# Patient Record
Sex: FEMALE | Race: WHITE | URBAN - METROPOLITAN AREA
[De-identification: names, ages, dates, MRNs, and addresses within clinical notes are randomized per-mention and may not be internally consistent; named-entity substitution may affect disease eponyms.]

---

## 2021-12-09 ENCOUNTER — VIRTUAL VISIT (OUTPATIENT)
Dept: PULMONOLOGY | Age: 81
End: 2021-12-09

## 2021-12-09 DIAGNOSIS — F41.1 GAD (GENERALIZED ANXIETY DISORDER): ICD-10-CM

## 2021-12-09 DIAGNOSIS — G47.00 INSOMNIA, UNSPECIFIED TYPE: Primary | ICD-10-CM

## 2021-12-09 PROCEDURE — 99203 OFFICE O/P NEW LOW 30 MIN: CPT | Performed by: INTERNAL MEDICINE

## 2021-12-09 RX ORDER — EZETIMIBE 10 MG/1
TABLET ORAL
COMMUNITY

## 2021-12-09 RX ORDER — CRANBERRY FRUIT EXTRACT 200 MG
CAPSULE ORAL
COMMUNITY

## 2021-12-09 RX ORDER — ESZOPICLONE 3 MG/1
2 TABLET, FILM COATED ORAL NIGHTLY
COMMUNITY

## 2021-12-09 RX ORDER — BETAMETHASONE DIPROPIONATE 0.05 %
OINTMENT (GRAM) TOPICAL
COMMUNITY

## 2021-12-09 RX ORDER — GABAPENTIN 100 MG/1
CAPSULE ORAL
COMMUNITY

## 2021-12-09 RX ORDER — CLOBETASOL PROPIONATE 0.5 MG/G
CREAM TOPICAL
COMMUNITY

## 2021-12-09 RX ORDER — LORAZEPAM 0.5 MG/1
TABLET ORAL
COMMUNITY
Start: 2021-12-02

## 2021-12-09 ASSESSMENT — SLEEP AND FATIGUE QUESTIONNAIRES
HOW LIKELY ARE YOU TO NOD OFF OR FALL ASLEEP WHILE LYING DOWN TO REST IN THE AFTERNOON WHEN CIRCUMSTANCES PERMIT: 1
HOW LIKELY ARE YOU TO NOD OFF OR FALL ASLEEP WHILE SITTING INACTIVE IN A PUBLIC PLACE: 0
HOW LIKELY ARE YOU TO NOD OFF OR FALL ASLEEP WHILE WATCHING TV: 1
HOW LIKELY ARE YOU TO NOD OFF OR FALL ASLEEP WHILE SITTING AND READING: 1
HOW LIKELY ARE YOU TO NOD OFF OR FALL ASLEEP WHILE SITTING AND TALKING TO SOMEONE: 0
ESS TOTAL SCORE: 3
HOW LIKELY ARE YOU TO NOD OFF OR FALL ASLEEP WHILE SITTING QUIETLY AFTER LUNCH WITHOUT ALCOHOL: 0
HOW LIKELY ARE YOU TO NOD OFF OR FALL ASLEEP IN A CAR, WHILE STOPPED FOR A FEW MINUTES IN TRAFFIC: 0
HOW LIKELY ARE YOU TO NOD OFF OR FALL ASLEEP WHEN YOU ARE A PASSENGER IN A CAR FOR AN HOUR WITHOUT A BREAK: 0

## 2021-12-09 NOTE — PROGRESS NOTES
Amira Venegas MD, Isela Herrera, [facility]  Tiffanie Kehrt CNP  Catherinesidney Gael JAMISON [address]  [address] [facility], [address] [facility] [phone]   [facility] [address]. [address] [facility]. Jair [address] [phone]     Video Visit- Consult    Pursuant to the emergency declaration under the [address] [place] [address] [facility] authority and the [employer] Act, this Virtual  Visit was conducted, with patient's consent, to reduce the patient's risk of exposure to COVID-19. Services were provided through a video synchronous discussion virtually to substitute for in-person clinic visit. Patient was located in their home. Assessment:      Visit Diagnoses and Associated Orders     Insomnia, unspecified type   (New Problem)  -  Primary    needs treatment         ALYSSA (generalized anxiety disorder)   (New Problem)      Needs treatment         ORDERS WITHOUT AN ASSOCIATED DIAGNOSIS    betamethasone dipropionate (DIPROLENE) 0.05 % ointment [1029]      vitamin D (CHOLECALCIFEROL) 25 MCG (1000 UT) TABS tablet [71734]      clobetasol (TEMOVATE) 0.05 % cream [6130]      cyanocobalamin 1000 MCG tablet [2009]      ezetimibe (ZETIA) 10 MG tablet [46095]      gabapentin (NEURONTIN) 100 MG capsule [08701]      hydrocortisone (WESTCORT) 0.2 % cream [37002]      LORazepam (ATIVAN) 0.5 MG tablet [8612]      Red Yeast Rice Extract 600 MG CAPS [78026]      eszopiclone (ESZOPICLONE) 3 MG TABS [63801]             Plan:      Had extended discussion with the patient about my concerns of needing a benzodiazepine or Lunesta to still help her maintain sleep. Somewhat unusual that there was a sudden onset of insomnia without any other underlying issues. During our visit the patient spoke several times about being anxious and constantly worried about things.   We had extended discussion about underlying anxiety being a possible cause of her insomnia and that needing treatment on a regular basis versus symptomatic treatment for her sleep issues. Discussed with the patient would probably benefit from some daily treatment for generalized anxiety combined with CBTI. This information was analyzed to assess complexity and medical decision making in regards to further testing and management. PDMP report reviewed today. Reviewed external notes from neurology dated 2/17/2020 patient history of severe polyneuropathy. Subjective:     Patient ID: Jada Elias is a 80 y.o. female. Chief Complaint   Patient presents with    Insomnia     new patient, reports trouble falling asleep at night, no previous sleep study       HPI:      Jada Elias is a 80 y.o. female self-referred for a sleep evaluation. Patient with a recent history of inability to initiate sleep. She states months ago she was at her son's house and also was not able to sleep. She was awake for hours for 2 nights in a row and then when she came back home still had issues falling asleep. She does not have complaints of pain that keeps her awake but feels her mind races throughout the nighttime. She often does worry about things and endorses some symptoms are consistent with anxiety. She was seen by another sleep physician and put on 9 mg of melatonin and 20 mg of gabapentin which helped initially. She had tried trazodone which made her feel groggy during the daytime. There is still 1-2 nights every week that she does not fall asleep so she will use either Lunesta 3 mg or lorazepam 0.5 mg. The patient often really worries about not being able to sleep. When she takes lorazepam she sleeps quite well. She has had a history of polyneuropathy which does bother her and she worries about her neuropathy but does not feel that pain keeps her awake at nighttime. Previous Report(s) Reviewed: historical medical records, office notes, and referral letter(s).  Pertinent data has been documented. [place] - Total score: 3    Caffeine Intake - None. Social History     Socioeconomic History    Marital status:      Spouse name: Not on file    Number of children: Not on file    Years of education: Not on file    Highest education level: Not on file   Occupational History    Not on file   Tobacco Use    Smoking status: Never Smoker    Smokeless tobacco: Never Used   Vaping Use    Vaping Use: Never used   Substance and Sexual Activity    Alcohol use: Yes     Alcohol/week: 0.0 standard drinks     Comment: rare    Drug use: Never    Sexual activity: Not on file   Other Topics Concern    Not on file   Social History Narrative    Not on file     Social Determinants of Health     Financial Resource Strain:     Difficulty of Paying Living Expenses: Not on file   Food Insecurity:     Worried About Running Out of Food in the Last Year: Not on file    Tucker of Food in the Last Year: Not on file   Transportation Needs:     Lack of Transportation (Medical): Not on file    Lack of Transportation (Non-Medical):  Not on file   Physical Activity:     Days of Exercise per Week: Not on file    Minutes of Exercise per Session: Not on file   Stress:     Feeling of Stress : Not on file   Social Connections:     Frequency of Communication with Friends and Family: Not on file    Frequency of Social Gatherings with Friends and Family: Not on file    Attends Evangelical Services: Not on file    Active Member of [address] [place] [address] or Organizations: Not on file    Attends Club or Organization Meetings: Not on file    Marital Status: Not on file   Intimate Partner Violence:     Fear of Current or Ex-Partner: Not on file    Emotionally Abused: Not on file    Physically Abused: Not on file    Sexually Abused: Not on file   Housing Stability:     Unable to Pay for Housing in the Last Year: Not on file    Number of [place] in the Last Year: Not on file    Unstable Housing in the Last Year: Not on

## 2021-12-09 NOTE — LETTER
[facility]  [place] [address] [place] [address]  Phone: [phone]  Fax: [phone]           Aneesh Vega MD      December 14, 2021     Patient: Lavon Murillo   MR Number: <[id number]>   YOB: 1940   Date of Visit: 12/9/2021       Dear Dr. Ceja Situ ref. provider found: Thank you for referring Lavon Murillo to me for evaluation/treatment. Below are the relevant portions of my assessment and plan of care. Visit Diagnoses and Associated Orders     Insomnia, unspecified type   (New Problem)  -  Primary    needs treatment         ALYSSA (generalized anxiety disorder)   (New Problem)      Needs treatment         ORDERS WITHOUT AN ASSOCIATED DIAGNOSIS    betamethasone dipropionate (DIPROLENE) 0.05 % ointment [1029]      vitamin D (CHOLECALCIFEROL) 25 MCG (1000 UT) TABS tablet [42745]      clobetasol (TEMOVATE) 0.05 % cream [6030]      cyanocobalamin 1000 MCG tablet [2009]      ezetimibe (ZETIA) 10 MG tablet [06132]      gabapentin (NEURONTIN) 100 MG capsule [61789]      hydrocortisone (WESTCORT) 0.2 % cream [62819]      LORazepam (ATIVAN) 0.5 MG tablet [6412]      Red Yeast Rice Extract 600 MG CAPS [16060]      eszopiclone (ESZOPICLONE) 3 MG TABS [48724]            Had extended discussion with the patient about my concerns of needing a benzodiazepine or Lunesta to still help her maintain sleep. Somewhat unusual that there was a sudden onset of insomnia without any other underlying issues. During our visit the patient spoke several times about being anxious and constantly worried about things. We had extended discussion about underlying anxiety being a possible cause of her insomnia and that needing treatment on a regular basis versus symptomatic treatment for her sleep issues. Discussed with the patient would probably benefit from some daily treatment for generalized anxiety combined with CBTI.     This information was analyzed to assess complexity and medical decision making in regards to further testing and management. PDMP report reviewed today. Reviewed external notes from neurology dated 2/17/2020 patient history of severe polyneuropathy. If you have questions, please do not hesitate to call me. I look forward to following Abner Contreras along with you.     Sincerely,        Mónica Luz MD    CC providers:  [id number]  [place] [address]  Via Fax: [phone]

## 2022-03-16 ENCOUNTER — TELEMEDICINE (OUTPATIENT)
Dept: PULMONOLOGY | Age: 82
End: 2022-03-16
Payer: MEDICARE

## 2022-03-16 DIAGNOSIS — G47.00 INSOMNIA, UNSPECIFIED TYPE: Primary | ICD-10-CM

## 2022-03-16 PROBLEM — F41.1 GAD (GENERALIZED ANXIETY DISORDER): Status: RESOLVED | Noted: 2022-03-16 | Resolved: 2022-03-16

## 2022-03-16 PROBLEM — F41.1 GAD (GENERALIZED ANXIETY DISORDER): Status: ACTIVE | Noted: 2022-03-16

## 2022-03-16 PROCEDURE — 99213 OFFICE O/P EST LOW 20 MIN: CPT | Performed by: INTERNAL MEDICINE

## 2022-03-16 PROCEDURE — 4040F PNEUMOC VAC/ADMIN/RCVD: CPT | Performed by: INTERNAL MEDICINE

## 2022-03-16 PROCEDURE — 1090F PRES/ABSN URINE INCON ASSESS: CPT | Performed by: INTERNAL MEDICINE

## 2022-03-16 PROCEDURE — 1123F ACP DISCUSS/DSCN MKR DOCD: CPT | Performed by: INTERNAL MEDICINE

## 2022-03-16 PROCEDURE — G8400 PT W/DXA NO RESULTS DOC: HCPCS | Performed by: INTERNAL MEDICINE

## 2022-03-16 PROCEDURE — G8427 DOCREV CUR MEDS BY ELIG CLIN: HCPCS | Performed by: INTERNAL MEDICINE

## 2022-03-16 ASSESSMENT — SLEEP AND FATIGUE QUESTIONNAIRES
ESS TOTAL SCORE: 1
HOW LIKELY ARE YOU TO NOD OFF OR FALL ASLEEP WHILE SITTING AND READING: 0
HOW LIKELY ARE YOU TO NOD OFF OR FALL ASLEEP WHILE WATCHING TV: 0
HOW LIKELY ARE YOU TO NOD OFF OR FALL ASLEEP WHILE SITTING AND TALKING TO SOMEONE: 0
HOW LIKELY ARE YOU TO NOD OFF OR FALL ASLEEP WHILE SITTING INACTIVE IN A PUBLIC PLACE: 0
HOW LIKELY ARE YOU TO NOD OFF OR FALL ASLEEP WHILE LYING DOWN TO REST IN THE AFTERNOON WHEN CIRCUMSTANCES PERMIT: 1
HOW LIKELY ARE YOU TO NOD OFF OR FALL ASLEEP WHEN YOU ARE A PASSENGER IN A CAR FOR AN HOUR WITHOUT A BREAK: 0
HOW LIKELY ARE YOU TO NOD OFF OR FALL ASLEEP WHILE SITTING QUIETLY AFTER LUNCH WITHOUT ALCOHOL: 0
HOW LIKELY ARE YOU TO NOD OFF OR FALL ASLEEP IN A CAR, WHILE STOPPED FOR A FEW MINUTES IN TRAFFIC: 0

## 2022-03-16 NOTE — ASSESSMENT & PLAN NOTE
Chronic-stable: Patient feels her anxiety is really not an issue at this time. She is sleeping well with gabapentin 3 mg and melatonin 5 mg which is being managed by her primary care doctor. At this point we will have the patient continue this regimen and reiterated to her that she is better off on this regimen versus a daily benzodiazepine or Lunesta. If her insomnia worsens we can see the patient back to discuss further. I have encouraged the patient to continue to have open discussions with the primary care doctor about any potential for anxiety that may need daily medication to treat versus benzos on a as needed basis if her insomnia worsens. At this time we will follow the patient up on a as needed basis.

## 2022-03-16 NOTE — LETTER
[facility]  [address] [address] [place] [address]  Phone: [phone]  Fax: [phone]    Brandi Crump MD    March 16, 2022     [id number] [facility] [address]    Patient: Alma Hinds   MR Number: [id number]   YOB: 1940   Date of Visit: 3/16/2022       Dear Ciara Rodriguez: Thank you for referring Alma Hinds to me for evaluation/treatment. Below are the relevant portions of my assessment and plan of care. 1. Insomnia, unspecified type  Assessment & Plan:   Chronic-stable: Patient feels her anxiety is really not an issue at this time. She is sleeping well with gabapentin 3 mg and melatonin 5 mg which is being managed by her primary care doctor. At this point we will have the patient continue this regimen and reiterated to her that she is better off on this regimen versus a daily benzodiazepine or Lunesta. If her insomnia worsens we can see the patient back to discuss further. I have encouraged the patient to continue to have open discussions with the primary care doctor about any potential for anxiety that may need daily medication to treat versus benzos on a as needed basis if her insomnia worsens. At this time we will follow the patient up on a as needed basis. PDMP report was reviewed. This information was analyzed to assess complexity and medical decision making in regards to further testing and management. If you have questions, please do not hesitate to call me. I look forward to following Olivia Subramanian along with you.     Sincerely,      Brandi Crump MD

## 2022-03-16 NOTE — PROGRESS NOTES
Stephanie Izaguirre MD, [facility], [facility]  Janet [address] SHAHEEN [address]  [address] [facility], [address] [phone]   [facility] [address]. [address] [place] [address]. [address] [phone]     Video Visit- Follow up    Pat Farmer, was evaluated through a synchronous (real-time) audio-video  encounter. The patient (or guardian if applicable) is aware that this is a billable  service, which includes applicable co-pays. This Virtual Visit was conducted with  patient's (and/or legal guardian's) consent. The visit was conducted pursuant to  the emergency declaration under the [address] [place] [address], [address] [place] [address] authority and the [employer] and  [employer] Act. Patient identification was verified,  and a caregiver was present when appropriate. The patient was located in a  state where the provider was licensed to provide care. Assessment/Plan:      1. Insomnia, unspecified type  Assessment & Plan:   Chronic-stable: Patient feels her anxiety is really not an issue at this time. She is sleeping well with gabapentin 3 mg and melatonin 5 mg which is being managed by her primary care doctor. At this point we will have the patient continue this regimen and reiterated to her that she is better off on this regimen versus a daily benzodiazepine or Lunesta. If her insomnia worsens we can see the patient back to discuss further. I have encouraged the patient to continue to have open discussions with the primary care doctor about any potential for anxiety that may need daily medication to treat versus benzos on a as needed basis if her insomnia worsens. At this time we will follow the patient up on a as needed basis. PDMP report was reviewed. This information was analyzed to assess complexity and medical decision making in regards to further testing and management. Subjective:     Patient ID: Pat Farmer is a 80 y.o. female. Chief Complaint   Patient presents with    Insomnia     Subjective   HPI:    Really did not feel anxiety was playing an issue for her. She has been placed on gabapentin for neuropathy now taking 300 mg along with melatonin 5 mg and feels she is sleeping very well with this regimen. She is not had to use any benzodiazepines or Lunesta for sleep. [place] - Total score: 1    Current Outpatient Medications   Medication Instructions    betamethasone dipropionate (DIPROLENE) 0.05 % ointment betamethasone dipropionate 0.05 % topical ointment   APPLY A THIN LAYER TO THE AFFECTED AREA(S) BY TOPICAL ROUTE ONCE DAILY AS DIRECTED    clobetasol (TEMOVATE) 0.05 % cream clobetasol 0.05 % topical cream    cyanocobalamin 1,000 mcg, Oral, DAILY    eszopiclone (ESZOPICLONE) 2 mg, Oral, NIGHTLY    ezetimibe (ZETIA) 10 MG tablet ezetimibe 10 mg tablet    gabapentin (NEURONTIN) 100 MG capsule gabapentin 100 mg capsule    hydrocortisone (WESTCORT) 0.2 % cream hydrocortisone valerate 0.2 % topical cream    LORazepam (ATIVAN) 0.5 MG tablet No dose, route, or frequency recorded.     Red Yeast Rice Extract 600 MG CAPS Oral    vitamin D (CHOLECALCIFEROL) 1,000 Units, Oral, DAILY        Electronically signed by Zarina Ingram MD on 3/16/2022 at 12:14 PM

## 2025-04-10 ENCOUNTER — PROCEDURE VISIT (OUTPATIENT)
Dept: SURGERY | Age: 85
End: 2025-04-10
Payer: MEDICARE

## 2025-04-10 VITALS — HEART RATE: 88 BPM | DIASTOLIC BLOOD PRESSURE: 63 MMHG | SYSTOLIC BLOOD PRESSURE: 107 MMHG

## 2025-04-10 DIAGNOSIS — C44.319 BASAL CELL CARCINOMA (BCC) OF RIGHT CHEEK: Primary | ICD-10-CM

## 2025-04-10 PROCEDURE — 12052 INTMD RPR FACE/MM 2.6-5.0 CM: CPT | Performed by: DERMATOLOGY

## 2025-04-10 PROCEDURE — 17311 MOHS 1 STAGE H/N/HF/G: CPT | Performed by: DERMATOLOGY

## 2025-04-10 RX ORDER — ESCITALOPRAM OXALATE 5 MG/1
5 TABLET ORAL DAILY
COMMUNITY

## 2025-04-10 NOTE — PATIENT INSTRUCTIONS
massage the area with a moisturizer, petroleum jelly (Vaseline) or Aquaphor. This helps to soften the scar tissue. You can begin this 1 month after the day of your surgery.  -A Silicone scar gel product may also be beneficial in regards to scar appearance. This can be used but is not usually necessary.  -The color of the scar will even out over time, but may remain pink for several months.     Call us at [phone] right away if you have any of the following symptoms:  -Bleeding that you cannot stop (see highlighted area above)   -Pain that lasts longer than 48 hours  -Your wound becomes more painful, red or hot to touch  -Bruising and swelling that does not begin to improve within the 48 hours or gets worse suddenly.

## 2025-04-10 NOTE — PROGRESS NOTES
MOHS PROCEDURE NOTE    PHYSICIAN:  Estefany Mendez MD, Who operated in two distinct and integrated capacities as the surgeon removing the tissue and as the pathologist examining the tissue.    ASSISTANT: Jalen Morales RN     REFERRING PROVIDER:  Deniz Felipe MD    PREOPERATIVE DIAGNOSIS:  Nodular Basal Cell Carcinoma, Ulcerated      SPECIFIC MOHS INDICATIONS:  location and need for tissue conservation    AUC SCORIN/9    POSTOPERATIVE DIAGNOSIS: SAME    LOCATION: Right Cheek    OPERATIVE PROCEDURE:  MOHS MICROGRAPHIC SURGERY    RECONSTRUCTION OF DEFECT: Intermediate layered closure    PREOPERATIVE SIZE: 8 x 6 MM    DEFECT SIZE: 12 x 12 MM    LENGTH OF REPAIRED WOUND/SIZE OF FLAP/SIZE OF GRAFT:  38 MM    ANESTHESIA:  7 mL 1% lidocaine with epinephrine 1:100,000 buffered.     EBL:  MINIMAL    DURATION OF PROCEDURE:  45 MIN    POSTOPERATIVE OBSERVATION: 30 MIN    SPECIMENS:  SEE MOHS MAP    COMPLICATIONS:  NONE    DESCRIPTION OF PROCEDURE:  The patient was given a mirror, as appropriate, and the biopsy site was identified, marked with a surgical marking pen, and verified by the patient.   Options for treatment were discussed and the patient was informed that Mohs surgery was the selected treatment based on its lower recurrence rate, given the features listed above, as compared to other treatment modalities such as excision, radiation, or curettage, and agreed with this treatment plan.  Risks and benefits including bruising, swelling, bleeding, infection, nerve injury, recurrence, and scarring were discussed with the patient prior to the procedure and a written consent detailing these and other risks was reviewed with the patient and signed.    There was a time out for person and procedure verification.  The surgical site was prepped with an antiseptic solution.  Application of an antiseptic solution was repeated before each surgical stage.      Stage I:  The clinically-apparent tumor was carefully defined and

## 2025-04-10 NOTE — PROGRESS NOTES
PRE-PROCEDURE SCREENING    Pacemaker/ICD: No  Difficulty with numbing in the past: No  Local Anesthesia Reaction/passing out: No  Latex or adhesive allergy:  No  Any history of reaction to suture or skin glue:  no  Bleeding/Clotting Disorders: No  Anticoagulant Therapy: No  Joint prosthesis: No  Artificial Heart Valve: No  Stroke or Seizures: No  Organ Transplant or Lymphoma: No  Immunosuppression: No  Respiratory Problems: No    States severe neuropathy in both legs/feet and uses walker.    Late Entry - patient states the lidocaine made her feel jittery.

## 2025-04-11 ENCOUNTER — TELEPHONE (OUTPATIENT)
Dept: SURGERY | Age: 85
End: 2025-04-11

## 2025-04-11 NOTE — TELEPHONE ENCOUNTER
The patient was in the office on 4/10/25 for Mohs surgery located on the right cheek with ILC repair.  The patient tolerated the procedure well and left the office in good condition.    Pain level on post-operative day 1:  none and level of pain (1-10, 10 severe), denies any pain    Any bleeding episode that required pressure to be held, bandage change or a call to the office or MD?  no     Any other issues?:  no    A post-operative telephone call was placed at 10:26 am in order to check on the patient's recovery process.  The patient reported doing well and had no complaints other than those listed above, if any.  All of the patient's questions were answered.

## 2025-04-29 ENCOUNTER — TELEPHONE (OUTPATIENT)
Dept: SURGERY | Age: 85
End: 2025-04-29

## 2025-04-29 NOTE — TELEPHONE ENCOUNTER
Pt had surgery 4/10, but has lost her post-op instructions. Requesting replacement be emailed to: [email or web address]